# Patient Record
Sex: MALE | Race: WHITE | URBAN - METROPOLITAN AREA
[De-identification: names, ages, dates, MRNs, and addresses within clinical notes are randomized per-mention and may not be internally consistent; named-entity substitution may affect disease eponyms.]

---

## 2019-08-22 ENCOUNTER — OFFICE VISIT (OUTPATIENT)
Dept: FAMILY MEDICINE CLINIC | Age: 34
End: 2019-08-22
Payer: COMMERCIAL

## 2019-08-22 VITALS
BODY MASS INDEX: 28.48 KG/M2 | HEART RATE: 80 BPM | HEIGHT: 66 IN | SYSTOLIC BLOOD PRESSURE: 124 MMHG | WEIGHT: 177.2 LBS | OXYGEN SATURATION: 99 % | TEMPERATURE: 97.6 F | DIASTOLIC BLOOD PRESSURE: 82 MMHG | RESPIRATION RATE: 16 BRPM

## 2019-08-22 DIAGNOSIS — Z11.59 ENCOUNTER FOR HEPATITIS C SCREENING TEST FOR LOW RISK PATIENT: ICD-10-CM

## 2019-08-22 DIAGNOSIS — Z11.3 ROUTINE SCREENING FOR STI (SEXUALLY TRANSMITTED INFECTION): ICD-10-CM

## 2019-08-22 DIAGNOSIS — Z11.4 ENCOUNTER FOR SCREENING FOR HIV: ICD-10-CM

## 2019-08-22 DIAGNOSIS — Z91.89 AT RISK FOR SEXUALLY TRANSMITTED DISEASE DUE TO PARTNER WITH MULTIPLE PARTNERS: ICD-10-CM

## 2019-08-22 DIAGNOSIS — Z20.2 EXPOSURE TO CHLAMYDIA: Primary | ICD-10-CM

## 2019-08-22 LAB — HEPATITIS C ANTIBODY INTERPRETATION: NORMAL

## 2019-08-22 PROCEDURE — 36415 COLL VENOUS BLD VENIPUNCTURE: CPT | Performed by: PHYSICIAN ASSISTANT

## 2019-08-22 PROCEDURE — 99204 OFFICE O/P NEW MOD 45 MIN: CPT | Performed by: PHYSICIAN ASSISTANT

## 2019-08-22 PROCEDURE — 96372 THER/PROPH/DIAG INJ SC/IM: CPT | Performed by: PHYSICIAN ASSISTANT

## 2019-08-22 RX ORDER — CEFTRIAXONE SODIUM 250 MG/1
250 INJECTION, POWDER, FOR SOLUTION INTRAMUSCULAR; INTRAVENOUS ONCE
Status: COMPLETED | OUTPATIENT
Start: 2019-08-22 | End: 2019-08-22

## 2019-08-22 RX ORDER — AZITHROMYCIN 500 MG/1
1000 TABLET, FILM COATED ORAL ONCE
Qty: 2 TABLET | Refills: 0 | Status: SHIPPED | OUTPATIENT
Start: 2019-08-22 | End: 2019-08-22

## 2019-08-22 RX ADMIN — CEFTRIAXONE SODIUM 250 MG: 250 INJECTION, POWDER, FOR SOLUTION INTRAMUSCULAR; INTRAVENOUS at 11:53

## 2019-08-22 ASSESSMENT — PATIENT HEALTH QUESTIONNAIRE - PHQ9
2. FEELING DOWN, DEPRESSED OR HOPELESS: 0
1. LITTLE INTEREST OR PLEASURE IN DOING THINGS: 0
SUM OF ALL RESPONSES TO PHQ QUESTIONS 1-9: 0
SUM OF ALL RESPONSES TO PHQ QUESTIONS 1-9: 0
SUM OF ALL RESPONSES TO PHQ9 QUESTIONS 1 & 2: 0

## 2019-08-22 NOTE — PROGRESS NOTES
Jazmin Stallworth 27 COMPLAINT  Chief Complaint   Patient presents with    New Patient     Est care for PCP.  Exposure to STD     His girlfriend was diagnosed with chlamydia       HISTORY OF PRESENT  ILLNESS  Kirsten Marie is a 35 y.o.  male   NEW PATIENT. Has not been to a provider in 15+ years. His partner (female) of 3 years who was diagnosed with chylamydia during her routine GYN exam last week. Neither have had symptoms. They have been strictly monogamous for 3 years. However, both have had multiple partners prior. He admits to having over 100 sexually encounters with females both protected and unprotected in past. Denies having prior STIs. His girlfriend ( soon to be wife) also has herpes simplex, treated with medication when diagnosed 2 years ago. Neither have had any outbreaks or lesions. Also, he complains of irritation to  his glans penis after intercourse of which he feels is due to a small area of foreskin that remains. Denies fever, rashes, weight loss, cough, sore throat, abdominal pain, changes in urinary or bowel habits. ROS    Remaining are reviewed and otherwise negative for other constitutional, neurologic, EENT, cardiac, pulmonary, GI, , musculoskeletal or extremity complaints. PAST MEDICAL/SURGICAL, SOCIAL, &  FAMILY HISTORY:  Reviewed and updated accordingly. ALLERGIES :   Patient has no known allergies. MEDICATIONS:  None      PHYSICAL EXAM     Vitals:    08/22/19 1050   BP: 124/82   Pulse: 80   Resp: 16   Temp: 97.6 °F (36.4 °C)   SpO2: 99%   Weight: 177 lb 3.2 oz (80.4 kg)   Height: 5' 6.3\" (1.684 m)   Body mass index is 28.34 kg/m². APPEARANCE: Pleasant, friendly, well-nourished. No acute distress. HEENT: Head: Normocephalic, atraumatic. Eyes: EOMI,PERRLA. Conjunctiva pink and moist. Sclera white. Ears: External ears uniform. Hearing intact. Nose: No septal deviation.    Mouth: Oral mucosa moist. Throat is without

## 2019-08-23 LAB
C. TRACHOMATIS DNA ,URINE: NEGATIVE
HIV AG/AB: NORMAL
HIV ANTIGEN: NORMAL
HIV-1 ANTIBODY: NORMAL
HIV-2 AB: NORMAL
N. GONORRHOEAE DNA, URINE: NEGATIVE
TOTAL SYPHILLIS IGG/IGM: NORMAL

## 2019-08-26 ENCOUNTER — TELEPHONE (OUTPATIENT)
Dept: FAMILY MEDICINE CLINIC | Age: 34
End: 2019-08-26

## 2019-09-25 ENCOUNTER — OFFICE VISIT (OUTPATIENT)
Dept: FAMILY MEDICINE CLINIC | Age: 34
End: 2019-09-25
Payer: COMMERCIAL

## 2019-09-25 VITALS
TEMPERATURE: 99 F | RESPIRATION RATE: 16 BRPM | DIASTOLIC BLOOD PRESSURE: 80 MMHG | HEART RATE: 60 BPM | HEIGHT: 66 IN | BODY MASS INDEX: 28.35 KG/M2 | SYSTOLIC BLOOD PRESSURE: 122 MMHG | WEIGHT: 176.4 LBS | OXYGEN SATURATION: 100 %

## 2019-09-25 DIAGNOSIS — Z00.00 ANNUAL PHYSICAL EXAM: Primary | ICD-10-CM

## 2019-09-25 LAB
A/G RATIO: 1.8 (ref 1.1–2.2)
ALBUMIN SERPL-MCNC: 5.1 G/DL (ref 3.4–5)
ALP BLD-CCNC: 59 U/L (ref 40–129)
ALT SERPL-CCNC: 84 U/L (ref 10–40)
ANION GAP SERPL CALCULATED.3IONS-SCNC: 18 MMOL/L (ref 3–16)
AST SERPL-CCNC: 71 U/L (ref 15–37)
BILIRUB SERPL-MCNC: 0.8 MG/DL (ref 0–1)
BUN BLDV-MCNC: 9 MG/DL (ref 7–20)
CALCIUM SERPL-MCNC: 10 MG/DL (ref 8.3–10.6)
CHLORIDE BLD-SCNC: 100 MMOL/L (ref 99–110)
CHOLESTEROL, TOTAL: 201 MG/DL (ref 0–199)
CO2: 23 MMOL/L (ref 21–32)
CREAT SERPL-MCNC: 1 MG/DL (ref 0.9–1.3)
GFR AFRICAN AMERICAN: >60
GFR NON-AFRICAN AMERICAN: >60
GLOBULIN: 2.8 G/DL
GLUCOSE BLD-MCNC: 91 MG/DL (ref 70–99)
HDLC SERPL-MCNC: 73 MG/DL (ref 40–60)
LDL CHOLESTEROL CALCULATED: 90 MG/DL
POTASSIUM SERPL-SCNC: 4.4 MMOL/L (ref 3.5–5.1)
SODIUM BLD-SCNC: 141 MMOL/L (ref 136–145)
TOTAL PROTEIN: 7.9 G/DL (ref 6.4–8.2)
TRIGL SERPL-MCNC: 192 MG/DL (ref 0–150)
VLDLC SERPL CALC-MCNC: 38 MG/DL

## 2019-09-25 PROCEDURE — 36415 COLL VENOUS BLD VENIPUNCTURE: CPT | Performed by: PHYSICIAN ASSISTANT

## 2019-09-25 PROCEDURE — 99395 PREV VISIT EST AGE 18-39: CPT | Performed by: PHYSICIAN ASSISTANT

## 2019-09-25 NOTE — PROGRESS NOTES
paraspinal muscle tenderness or deformities. Full range of motion in all planes. HEART:  Regular rate and rhythm. No murmurs, rubs, or gallops. VASCULAR:  No jugular venous distension or carotid bruits. Pulses 2+ and symmetrical to carotid, femoral, and dorsalis pedis. Capillary refill <3secs. ABDOMEN: Without scars. Soft, non-tender, normoactive bowel sounds. No pulsatile masses or hepatosplenomegaly. No injuinal nodes or hernias. GENITORECTAL MALE: performed last visit. EXTREMITIES: No skin changes. No new erythema, edema, or exudates. No bony deformities. Symmetrical muscle strength to all joints. Full range of motion without eliciting pain. Sensation intact. DTR's are equal and intact. Vascular is intact. NEUROLOGIC: Grossly non focal. Cranial Nerves II-XII intact. Negative Rhomberg. No pronator drift. Cerebellar functions intact using heel along shin/finger to nose. SKIN: Warm, dry and intact. No rashes, petechia, purpura. No suspicious lesions. No nail clubbing or cyanosis. PSYCHIATRIC:  Answers and understands questions appropriately. Normal affect, behavior,  and mood. ADDITIONAL DATA:  Prior clinic notes were reviewed. ASSESSMENT & PLAN:    Jennifer Gallardo was seen today for annual exam.    Diagnoses and all orders for this visit:    Annual physical exam  -    Very healthy. Encouraged continued healthy living. Wishes to defer Tdap. -     Comprehensive Metabolic Panel  -     Lipid Panel    - Medical records updated today. Instructed on monthly routine skin and testicular exams.    - Discussed top 3 most important preventive health measures for [facility] which includes but not limited:  Patient Instructions   Continue excellent exercise regimen. Avoid energy drinks and reduce caffeine.